# Patient Record
Sex: MALE | Race: OTHER | HISPANIC OR LATINO | URBAN - METROPOLITAN AREA
[De-identification: names, ages, dates, MRNs, and addresses within clinical notes are randomized per-mention and may not be internally consistent; named-entity substitution may affect disease eponyms.]

---

## 2018-05-05 ENCOUNTER — EMERGENCY (EMERGENCY)
Facility: HOSPITAL | Age: 38
LOS: 1 days | Discharge: HOME | End: 2018-05-05
Attending: EMERGENCY MEDICINE | Admitting: EMERGENCY MEDICINE
Payer: SELF-PAY

## 2018-05-05 VITALS
SYSTOLIC BLOOD PRESSURE: 158 MMHG | OXYGEN SATURATION: 99 % | DIASTOLIC BLOOD PRESSURE: 106 MMHG | TEMPERATURE: 98 F | HEART RATE: 91 BPM | RESPIRATION RATE: 18 BRPM

## 2018-05-05 PROCEDURE — 99053 MED SERV 10PM-8AM 24 HR FAC: CPT

## 2018-05-05 PROCEDURE — 36415 COLL VENOUS BLD VENIPUNCTURE: CPT

## 2018-05-05 PROCEDURE — 80307 DRUG TEST PRSMV CHEM ANLYZR: CPT

## 2018-05-05 PROCEDURE — 99284 EMERGENCY DEPT VISIT MOD MDM: CPT | Mod: 25

## 2018-05-05 PROCEDURE — 82962 GLUCOSE BLOOD TEST: CPT

## 2018-05-05 PROCEDURE — 99283 EMERGENCY DEPT VISIT LOW MDM: CPT
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
## 2018-05-06 VITALS
SYSTOLIC BLOOD PRESSURE: 115 MMHG | OXYGEN SATURATION: 99 % | HEART RATE: 66 BPM | DIASTOLIC BLOOD PRESSURE: 74 MMHG | RESPIRATION RATE: 18 BRPM | TEMPERATURE: 98 F

## 2018-05-06 LAB — ETHANOL SERPL-MCNC: 186 MG/DL — SIGNIFICANT CHANGE UP
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
## 2018-05-06 NOTE — ED PROVIDER NOTE - CHPI ED SYMPTOMS NEG
no neck tenderness/no difficulty bearing weight/no headache/no loss of consciousness/no crying/no decreased eating/drinking/no dizziness/no fussiness/no disorientation/no back pain/no pain/no sleeping issues/no bruising/no laceration
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
## 2018-05-06 NOTE — ED PROVIDER NOTE - PROGRESS NOTE DETAILS
Pt not complaining of any injuries. Pt is clinically sober. Neuro-intact. Pt requesting to leave because he has work tomorrow. No physical injuries. Will dc pt. No focal neuro deficits.

## 2024-03-29 ENCOUNTER — EMERGENCY (EMERGENCY)
Facility: HOSPITAL | Age: 44
LOS: 0 days | Discharge: HOME | End: 2024-03-29
Attending: STUDENT IN AN ORGANIZED HEALTH CARE EDUCATION/TRAINING PROGRAM
Payer: COMMERCIAL

## 2024-03-29 VITALS
HEART RATE: 78 BPM | SYSTOLIC BLOOD PRESSURE: 180 MMHG | DIASTOLIC BLOOD PRESSURE: 85 MMHG | RESPIRATION RATE: 16 BRPM | TEMPERATURE: 98 F | OXYGEN SATURATION: 95 %

## 2024-03-29 VITALS — WEIGHT: 179.9 LBS | HEIGHT: 65 IN

## 2024-03-29 DIAGNOSIS — R09.81 NASAL CONGESTION: ICD-10-CM

## 2024-03-29 DIAGNOSIS — I10 ESSENTIAL (PRIMARY) HYPERTENSION: ICD-10-CM

## 2024-03-29 LAB
FLUAV AG NPH QL: SIGNIFICANT CHANGE UP
FLUBV AG NPH QL: SIGNIFICANT CHANGE UP
RSV RNA NPH QL NAA+NON-PROBE: SIGNIFICANT CHANGE UP
SARS-COV-2 RNA SPEC QL NAA+PROBE: SIGNIFICANT CHANGE UP

## 2024-03-29 PROCEDURE — 99283 EMERGENCY DEPT VISIT LOW MDM: CPT | Mod: 25

## 2024-03-29 PROCEDURE — 71045 X-RAY EXAM CHEST 1 VIEW: CPT

## 2024-03-29 PROCEDURE — 71045 X-RAY EXAM CHEST 1 VIEW: CPT | Mod: 26

## 2024-03-29 PROCEDURE — 0241U: CPT

## 2024-03-29 PROCEDURE — 99284 EMERGENCY DEPT VISIT MOD MDM: CPT | Mod: 25

## 2024-03-29 RX ORDER — OXYMETAZOLINE HYDROCHLORIDE 0.5 MG/ML
1 SPRAY NASAL ONCE
Refills: 0 | Status: COMPLETED | OUTPATIENT
Start: 2024-03-29 | End: 2024-03-29

## 2024-03-29 RX ORDER — ACETAMINOPHEN 500 MG
975 TABLET ORAL ONCE
Refills: 0 | Status: COMPLETED | OUTPATIENT
Start: 2024-03-29 | End: 2024-03-29

## 2024-03-29 RX ADMIN — Medication 975 MILLIGRAM(S): at 03:33

## 2024-03-29 RX ADMIN — OXYMETAZOLINE HYDROCHLORIDE 1 SPRAY(S): 0.5 SPRAY NASAL at 05:50

## 2024-03-29 NOTE — ED ADULT NURSE NOTE - OBJECTIVE STATEMENT
Pt is 45yo Male, A&Ox4, breathing unlabored, presenting with c/o nasal congestion and "inability to breathe" though his nose. Pt endorses body aches all over starting today. Pt denies CP, SOB and fevers. Pt is well appearing and does not appear to be in any cute distress.

## 2024-03-29 NOTE — ED PROVIDER NOTE - OBJECTIVE STATEMENT
45 yo M likely with undiagnosed essential HTN, pw congestion, myalgias x 1 week. Pt has been using nasal saline and antihistamine nose spray without relief. No fever chills, no CP, no sob, no GI or  symptoms. Pt has no PMD.     Patient speaks some english, offered professional translation by language line, prefer partner Juana to translate over the phone (she has been managing his symptoms with OTC and is more familiar with what medications he has been using.

## 2024-03-29 NOTE — ED PROVIDER NOTE - PROGRESS NOTE DETAILS
Luz Maria Ardon MD, Attending  provided education and reassurance. pt agreeable to going home with PMD referral.

## 2024-03-29 NOTE — ED ADULT TRIAGE NOTE - CHIEF COMPLAINT QUOTE
Pt ambulatory to ED with c/o cough, body ache and nasal congestion x 1 week. Pt denies chest pain, SOB or fever. - PMH - allergies [id number] in triage

## 2024-03-29 NOTE — ED PROVIDER NOTE - NSFOLLOWUPINSTRUCTIONS_ED_ALL_ED_FT
** You were not found to have pneumonia, COVID, Flu or RSV (respiratory virus).   ** You have high blood pressure and need to be started on medication. Please establish care with a primary care doctor. A referral coordinator will reach out to you to help you schedule an appointment.      ** Take over the counter Tylenol or Ibuprofen for pain -- follow dosing directions on original bottle.    ** Follow up with your primary care doctor in the next 72 hours.     ** Go to the nearest Emergency Department if you experience any new or concerning symptoms, such as:   - chest pain  - difficulty breathing  - severe headache, passing out  - fever, chills

## 2024-03-29 NOTE — ED PROVIDER NOTE - CLINICAL SUMMARY MEDICAL DECISION MAKING FREE TEXT BOX
Luz Maria Ardon MD, Attending  ddx includes, but is not limited to the following: viral syndrome, sinusitis, lower suspicion for PNA.   plan: flu swab, CXR, medicate. facilitated oupt followup.   update: see progress notes.   ----

## 2024-03-29 NOTE — ED PROVIDER NOTE - PHYSICAL EXAMINATION
Luz Maria Ardon MD Attending  GEN: Patient awake alert NAD.   HEENT: normocephalic, atraumatic, EOMI, no scleral icterus, moist MM  CARDIAC: RRR, + hypertensive, S1, S2, no murmur.   PULM: CTA B/L no wheeze, rhonchi, rales.   ABD: soft NT, ND  MSK: Moving all extremities, no edema. 5/5 strength and full ROM in all extremities.     NEURO: A&Ox3, gait normal, no focal neurological deficits, CN 2-12 grossly intact  SKIN: warm, dry, no rash.

## 2024-03-29 NOTE — ED PROVIDER NOTE - PATIENT PORTAL LINK FT
You can access the FollowMyHealth Patient Portal offered by [facility] by registering at the following website: [email or web address]. By joining [facility]’s FollowMyHealth portal, you will also be able to view your health information using other applications (apps) compatible with our system.